# Patient Record
Sex: FEMALE | Race: WHITE | NOT HISPANIC OR LATINO | URBAN - NONMETROPOLITAN AREA
[De-identification: names, ages, dates, MRNs, and addresses within clinical notes are randomized per-mention and may not be internally consistent; named-entity substitution may affect disease eponyms.]

---

## 2018-03-15 NOTE — PATIENT DISCUSSION
PLAN: CE/IOL OD THEN OS, +/-I-STENT OU, GOAL AFIA OU, P.O WITH JRL, NO VAN NEEDED. WANTS BASIC PLUS OD, CUSTOM OS. UNDERSTANDS HE WILL NEED GLASSES FOR NEAR.

## 2018-03-29 PROBLEM — H52.03: Noted: 2018-03-29

## 2018-12-17 NOTE — PROCEDURE NOTE: SURGICAL
<p>Prior to commencing surgery patient identification, surgical procedure, site, and side were confirmed by Dr. Alia Kay. Following topical proparacaine anesthesia, the patient was positioned at the YAG laser, a contact lens coupled to the cornea with methylcellulose and an axial posterior capsulotomy performed without complication using 3.2 Mj x 19. Excess methylcellulose was washed from the eye, one drop of Alphagan was instilled and the patient returned to the holding area having tolerated the procedure well and without complication. </p><p>MRN:[id number]</p>

## 2019-01-31 NOTE — PATIENT DISCUSSION
WILL WORK ON DRYNESS FIRST WITH AT'S QID AND WC'S BID. RECHECK MR IN 1 MONTH AND SEE IF MR IS STABLE AND IF PT APPRECIATES TRIAL FRAME.

## 2019-02-13 NOTE — PATIENT DISCUSSION
RN spoke to Addie to convey PCP message below. Patient states she is planning to get a different prenatal with iron than the one originally prescribed. States she will take the additional iron supplement as ordered.    Patient verbalized understanding of results and had no further questions.   Recommended artificial tears to use as directed.

## 2019-09-10 ENCOUNTER — IMPORTED ENCOUNTER (OUTPATIENT)
Dept: URBAN - NONMETROPOLITAN AREA CLINIC 1 | Facility: CLINIC | Age: 59
End: 2019-09-10

## 2019-09-10 PROCEDURE — S0621 ROUTINE OPHTHALMOLOGICAL EXA: HCPCS

## 2019-09-10 NOTE — PATIENT DISCUSSION
Hyperopia-Discussed diagnosis with patient. Updated spec Rx given.   Recommend lens that will provide comfort as well as protect safety and health of eyes.; 's Notes: [id number] [facility]

## 2021-09-10 ENCOUNTER — IMPORTED ENCOUNTER (OUTPATIENT)
Dept: URBAN - NONMETROPOLITAN AREA CLINIC 1 | Facility: CLINIC | Age: 61
End: 2021-09-10

## 2021-09-10 PROBLEM — H43.811: Noted: 2021-09-10

## 2021-09-10 PROBLEM — H43.11: Noted: 2021-09-10

## 2021-09-10 PROBLEM — H35.61: Noted: 2021-09-10

## 2021-09-10 PROBLEM — H25.813: Noted: 2021-09-10

## 2021-09-10 PROCEDURE — 99204 OFFICE O/P NEW MOD 45 MIN: CPT

## 2021-09-10 PROCEDURE — 92134 CPTRZ OPH DX IMG PST SGM RTA: CPT

## 2021-09-10 NOTE — PATIENT DISCUSSION
Vitreous Hemorrhage/Retinal Hemorrhage/PVD OD-Unable to visualize ONH or macula-Gross peripheral exam appears to show retina attached-VA 20/200-MAC OCT unable to obtain image-Phone consult with Dr. Roxanne Maza.   Patient instructed to proceed to [facility], [facility] office for evaluation.; Dr's Notes: [facility]

## 2022-04-09 ASSESSMENT — TONOMETRY
OD_IOP_MMHG: 14
OS_IOP_MMHG: 22
OS_IOP_MMHG: 14
OD_IOP_MMHG: 16

## 2022-04-09 ASSESSMENT — VISUAL ACUITY
OS_CC: J1
OD_CC: J1
OS_SC: 20/20
OD_SC: 20/25-2
OS_SC: 20/20-1
OD_SC: 20/200

## 2022-12-27 ENCOUNTER — ESTABLISHED PATIENT (OUTPATIENT)
Dept: RURAL CLINIC 2 | Facility: CLINIC | Age: 62
End: 2022-12-27

## 2022-12-27 PROCEDURE — 92015 DETERMINE REFRACTIVE STATE: CPT

## 2022-12-27 PROCEDURE — 92014 COMPRE OPH EXAM EST PT 1/>: CPT

## 2022-12-27 ASSESSMENT — VISUAL ACUITY
OD_CC: 20/20
OS_CC: 20/20

## 2022-12-27 ASSESSMENT — TONOMETRY
OD_IOP_MMHG: 19
OS_IOP_MMHG: 19

## 2024-06-17 ENCOUNTER — ESTABLISHED PATIENT (OUTPATIENT)
Dept: RURAL CLINIC 2 | Facility: CLINIC | Age: 64
End: 2024-06-17

## 2024-06-17 DIAGNOSIS — D31.32: ICD-10-CM

## 2024-06-17 DIAGNOSIS — H25.813: ICD-10-CM

## 2024-06-17 DIAGNOSIS — H43.811: ICD-10-CM

## 2024-06-17 DIAGNOSIS — H52.223: ICD-10-CM

## 2024-06-17 DIAGNOSIS — H52.4: ICD-10-CM

## 2024-06-17 DIAGNOSIS — H35.371: ICD-10-CM

## 2024-06-17 DIAGNOSIS — H52.03: ICD-10-CM

## 2024-06-17 DIAGNOSIS — H33.311: ICD-10-CM

## 2024-06-17 PROCEDURE — 92014 COMPRE OPH EXAM EST PT 1/>: CPT

## 2024-06-17 PROCEDURE — 92134 CPTRZ OPH DX IMG PST SGM RTA: CPT

## 2024-06-17 PROCEDURE — 92015 DETERMINE REFRACTIVE STATE: CPT

## 2024-06-17 ASSESSMENT — VISUAL ACUITY
OS_CC: 20/20-2
OD_CC: 20/25+2

## 2024-06-17 ASSESSMENT — TONOMETRY
OD_IOP_MMHG: 18
OS_IOP_MMHG: 17

## 2025-07-09 ENCOUNTER — COMPREHENSIVE EXAM (OUTPATIENT)
Age: 65
End: 2025-07-09

## 2025-07-09 DIAGNOSIS — H35.371: ICD-10-CM

## 2025-07-09 DIAGNOSIS — H52.4: ICD-10-CM

## 2025-07-09 DIAGNOSIS — H25.813: ICD-10-CM

## 2025-07-09 DIAGNOSIS — D31.32: ICD-10-CM

## 2025-07-09 DIAGNOSIS — H43.811: ICD-10-CM

## 2025-07-09 DIAGNOSIS — H52.223: ICD-10-CM

## 2025-07-09 DIAGNOSIS — H52.03: ICD-10-CM

## 2025-07-09 DIAGNOSIS — H33.311: ICD-10-CM

## 2025-07-09 PROCEDURE — 92134 CPTRZ OPH DX IMG PST SGM RTA: CPT

## 2025-07-09 PROCEDURE — 92014 COMPRE OPH EXAM EST PT 1/>: CPT

## 2025-07-09 PROCEDURE — 92015 DETERMINE REFRACTIVE STATE: CPT
